# Patient Record
Sex: MALE | Race: WHITE | URBAN - METROPOLITAN AREA
[De-identification: names, ages, dates, MRNs, and addresses within clinical notes are randomized per-mention and may not be internally consistent; named-entity substitution may affect disease eponyms.]

---

## 2021-10-20 ENCOUNTER — OFFICE VISIT (OUTPATIENT)
Dept: ORTHOPEDIC SURGERY | Age: 66
End: 2021-10-20
Payer: MEDICARE

## 2021-10-20 VITALS
TEMPERATURE: 97.3 F | BODY MASS INDEX: 25.01 KG/M2 | WEIGHT: 165 LBS | OXYGEN SATURATION: 95 % | HEIGHT: 68 IN | HEART RATE: 95 BPM

## 2021-10-20 DIAGNOSIS — M70.22 OLECRANON BURSITIS OF LEFT ELBOW: ICD-10-CM

## 2021-10-20 DIAGNOSIS — M25.422 EFFUSION OF LEFT ELBOW: Primary | ICD-10-CM

## 2021-10-20 PROCEDURE — 1101F PT FALLS ASSESS-DOCD LE1/YR: CPT | Performed by: SPECIALIST

## 2021-10-20 PROCEDURE — G8432 DEP SCR NOT DOC, RNG: HCPCS | Performed by: SPECIALIST

## 2021-10-20 PROCEDURE — 20605 DRAIN/INJ JOINT/BURSA W/O US: CPT | Performed by: SPECIALIST

## 2021-10-20 PROCEDURE — G8536 NO DOC ELDER MAL SCRN: HCPCS | Performed by: SPECIALIST

## 2021-10-20 PROCEDURE — G8427 DOCREV CUR MEDS BY ELIG CLIN: HCPCS | Performed by: SPECIALIST

## 2021-10-20 PROCEDURE — 3017F COLORECTAL CA SCREEN DOC REV: CPT | Performed by: SPECIALIST

## 2021-10-20 PROCEDURE — 99203 OFFICE O/P NEW LOW 30 MIN: CPT | Performed by: SPECIALIST

## 2021-10-20 PROCEDURE — G8419 CALC BMI OUT NRM PARAM NOF/U: HCPCS | Performed by: SPECIALIST

## 2021-10-20 RX ORDER — BETAMETHASONE SODIUM PHOSPHATE AND BETAMETHASONE ACETATE 3; 3 MG/ML; MG/ML
3 INJECTION, SUSPENSION INTRA-ARTICULAR; INTRALESIONAL; INTRAMUSCULAR; SOFT TISSUE ONCE
Status: COMPLETED | OUTPATIENT
Start: 2021-10-20 | End: 2021-10-20

## 2021-10-20 RX ADMIN — BETAMETHASONE SODIUM PHOSPHATE AND BETAMETHASONE ACETATE 3 MG: 3; 3 INJECTION, SUSPENSION INTRA-ARTICULAR; INTRALESIONAL; INTRAMUSCULAR; SOFT TISSUE at 10:06

## 2021-10-20 NOTE — PROGRESS NOTES
Patient: Sasha Maldonado                MRN: [id number]       SSN: [id number]  YOB: 1955        AGE: 74 y.o. SEX: male    PCP: Unknown, Provider, MD  10/20/21    Chief Complaint   Patient presents with    Elbow Pain     left elbow     HISTORY:  Sasha Maldonado is a 74 y.o. male who is seen for left elbow pain and swelling. He attributes some of his left elbow pain and swelling to resting his elbow on a porch rail. He says his elbow was more swollen 3 weeks ago. Blood work ordered by his PCP showed no sign of infection. He has been taking 600 mg ibuprofen 3-4 times per day to help reduce the swelling. He is left handed. Pain Assessment  10/20/2021   Location of Pain Elbow   Location Modifiers Left   Severity of Pain 2   Quality of Pain Burning   Frequency of Pain Intermittent   Date Pain First Started (No Data)   Date Pain First Started Comment 3 weeks ago   Aggravating Factors Bending;Stretching;Straightening   Limiting Behavior Yes   Relieving Factors Rest     Occupation, etc:  Mr. Peggy Baldwin is retired. He worked as a  for [employer]. He lives on the [address] [place] [address] in [place], [place]. He likes to go on his [address] [place] [address] Street. He has 2 daughters. He has 5 grandchildren and 12 great grandchildren. He has a Stuk[employer] poo mix named Dorcas Frank. Mr. Peggy Baldwin weighs 165 lbs and is 5'8\" tall.        Lab Results   Component Value Date/Time    Hemoglobin A1c 5.7 (H) 10/17/2014 08:03 AM     Weight Metrics 10/20/2021 10/23/2014 4/23/2014 10/23/2013 4/10/2013 10/10/2012 4/11/2012   Weight 165 lb 165 lb 165 lb 165 lb 166 lb 175 lb 178 lb   BMI 25.09 kg/m2 24.36 kg/m2 24.36 kg/m2 24.36 kg/m2 24.5 kg/m2 25.83 kg/m2 26.27 kg/m2       Patient Active Problem List   Diagnosis Code    Hypercholesterolemia E78.00    COPD (chronic obstructive pulmonary disease) ([facility]) J44.9    GERD (gastroesophageal reflux disease) K21.9    ED (erectile dysfunction) N52.9    Insulin resistance E88.81    Hypogonadism, male E29.1    Hyperglycemia R73.9    Hypovitaminosis D E55.9     REVIEW OF SYSTEMS:    Constitutional Symptoms: Negative   Eyes: Negative   Ears, Nose, Throat and Mouth: Negative   Cardiovascular: Negative   Respiratory: Negative   Genitourinary: Per HPI   Gastrointestinal: Per HPI   Integumentary (Skin and/or Breast): Negative   Musculoskeletal: Per HPI   Endocrine/Rheumatologic: Negative   Neurological: Per HPI   Hematology/Lymphatic: Negative    Allergic/Immunologic: Negative   Phychiatric: Negative    Social History     Socioeconomic History    Marital status:      Spouse name: Not on file    Number of children: Not on file    Years of education: Not on file    Highest education level: Not on file   Occupational History    Not on file   Tobacco Use    Smoking status: Current Every Day Smoker     Packs/day: 2.00     Years: 42.00     Pack years: 84.00    Smokeless tobacco: Never Used   Substance and Sexual Activity    Alcohol use: Yes     Alcohol/week: 4.2 standard drinks     Types: 5 Cans of beer per week    Drug use: No    Sexual activity: Not on file   Other Topics Concern    Not on file   Social History Narrative    Not on file     Social Determinants of Health     Financial Resource Strain:     Difficulty of Paying Living Expenses:    Food Insecurity:     Worried About Running Out of Food in the Last Year:     [address] in the Last Year:    Transportation Needs:     Lack of Transportation (Medical):      Lack of Transportation (Non-Medical):    Physical Activity:     Days of Exercise per Week:     Minutes of Exercise per Session:    Stress:     Feeling of Stress :    Social Connections:     Frequency of Communication with Friends and Family:     Frequency of Social Gatherings with Friends and Family:     Attends [employer] Services:     Active Member of Clubs or Organizations:     Attends Club or Organization Meetings:     Marital Status:    Intimate Partner Violence:     Fear of Current or Ex-Partner:     Emotionally Abused:     Physically Abused:     Sexually Abused:       No Known Allergies   Current Outpatient Medications   Medication Sig    albuterol (VENTOLIN HFA) 90 mcg/actuation inhaler Take 2 puffs by inhalation every six (6) hours as needed for Wheezing.  VYTORIN 10/20 10-20 mg per tablet TAKE 1 TABLET NIGHTLY    budesonide-formoterol (SYMBICORT) 160-4.5 mcg/actuation HFA inhaler Take 2 puffs by inhalation two (2) times a day.  ibuprofen (MOTRIN) 600 mg tablet TAKE 1 TABLET TWICE A DAY WITH MEALS    esomeprazole (NEXIUM) 40 mg capsule TAKE 1 CAPSULE DAILY BEFORE BREAKFAST    aspirin delayed-release 81 mg tablet Take 1 tablet by mouth daily.  sildenafil citrate (VIAGRA) 50 mg tablet TAKE 1 TABLET AS NEEDED    cyanocobalamin (VITAMIN B-12) 500 mcg tablet Take 1 tablet by mouth daily. (Patient not taking: Reported on 10/20/2021)     No current facility-administered medications for this visit.       PHYSICAL EXAMINATION:  Visit Vitals  Pulse 95   Temp 97.3 °F (36.3 °C) (Temporal)   Ht 5' 8\" (1.727 m)   Wt 165 lb (74.8 kg)   SpO2 95%   BMI 25.09 kg/m²      ORTHO EXAMINATION:  Examination Left Elbow Right Elbow   Skin Mild erythema Intact   Range of Motion 130-0 135-0   Tenderness - -   Swelling + olecranon -   Bruising - -   Stability Normal Normal   Motor Strength  Normal Normal   Neurovascular Intact Intact          TIME OUT:  Chart reviewed for the following:   IOlga Lidia MD, have reviewed the History, Physical and updated the Allergic reactions for Lowell Nipper   TIME OUT performed immediately prior to start of procedure:  Veronica Seip, MD, have performed the following reviews on Zhane Hickey prior to the start of the procedure:          * Patient was identified by name and date of birth   * Agreement on procedure being performed was verified  * Risks and Benefits explained to the patient  * Procedure site verified and marked as necessary  * Patient was positioned for comfort  * Consent was obtained     Time: 9:55 AM     Date of procedure: 10/20/2021  Procedure performed by:  Aquilino Jolly MD  Mr. Zurita tolerated the procedure well with no complications. IMPRESSION:      ICD-10-CM ICD-9-CM    1. Effusion of left elbow  M25.422 719.02    2. Olecranon bursitis of left elbow  M70.22 726.33 betamethasone (CELESTONE) injection 3 mg      DRAIN/INJECT INTERMEDIATE JOINT/BURSA     PLAN:  Apply moist heat. Compression dressing applied. After timeout and under sterile conditions, left olecranon bursa aspirated 10 cc of kumar colored fluid. The fluid was discarded. After discussing treatment options, patient's left olecranon bursa was injected with 4 cc Marcaine and 1/2 cc Celestone. There is no need for surgery at this time. He will follow up as needed.       Scribed by Silvestre Manrique ([facility]) as dictated by Aquilino Jolly MD